# Patient Record
Sex: MALE | Race: WHITE | NOT HISPANIC OR LATINO | Employment: STUDENT | URBAN - METROPOLITAN AREA
[De-identification: names, ages, dates, MRNs, and addresses within clinical notes are randomized per-mention and may not be internally consistent; named-entity substitution may affect disease eponyms.]

---

## 2023-05-03 PROBLEM — F41.9 ANXIETY DISORDER: Status: ACTIVE | Noted: 2023-05-03

## 2023-05-03 PROBLEM — R50.9 FEVER: Status: ACTIVE | Noted: 2023-05-03

## 2023-05-03 PROBLEM — R48.0 DYSLEXIA: Status: ACTIVE | Noted: 2023-05-03

## 2023-05-03 PROBLEM — U07.1 COVID-19 VIRUS INFECTION: Status: ACTIVE | Noted: 2023-05-03

## 2023-05-03 RX ORDER — MOMETASONE FUROATE 50 UG/1
2 SPRAY, METERED NASAL DAILY
COMMUNITY
End: 2024-04-03 | Stop reason: ALTCHOICE

## 2023-05-03 RX ORDER — HYDROXYZINE HYDROCHLORIDE 25 MG/1
25 TABLET, FILM COATED ORAL 3 TIMES DAILY
COMMUNITY
Start: 2019-03-05 | End: 2024-04-03 | Stop reason: ALTCHOICE

## 2023-05-03 RX ORDER — TRIAMCINOLONE ACETONIDE 1 MG/G
0.1 CREAM TOPICAL 2 TIMES DAILY
COMMUNITY
End: 2024-04-03 | Stop reason: ALTCHOICE

## 2023-05-03 RX ORDER — SERTRALINE HYDROCHLORIDE 100 MG/1
100 TABLET, FILM COATED ORAL DAILY
COMMUNITY
Start: 2019-03-05 | End: 2023-05-04 | Stop reason: SDUPTHER

## 2023-05-04 ENCOUNTER — OFFICE VISIT (OUTPATIENT)
Dept: PEDIATRICS | Facility: CLINIC | Age: 23
End: 2023-05-04
Payer: COMMERCIAL

## 2023-05-04 VITALS
DIASTOLIC BLOOD PRESSURE: 80 MMHG | HEIGHT: 70 IN | SYSTOLIC BLOOD PRESSURE: 125 MMHG | BODY MASS INDEX: 42.78 KG/M2 | WEIGHT: 298.8 LBS | HEART RATE: 81 BPM

## 2023-05-04 DIAGNOSIS — Z23 ENCOUNTER FOR IMMUNIZATION: ICD-10-CM

## 2023-05-04 DIAGNOSIS — F41.9 ANXIETY DISORDER, UNSPECIFIED TYPE: Primary | ICD-10-CM

## 2023-05-04 DIAGNOSIS — Z00.00 WELL ADULT EXAM: ICD-10-CM

## 2023-05-04 PROBLEM — R50.9 FEVER: Status: RESOLVED | Noted: 2023-05-03 | Resolved: 2023-05-04

## 2023-05-04 PROCEDURE — 90471 IMMUNIZATION ADMIN: CPT | Performed by: PEDIATRICS

## 2023-05-04 PROCEDURE — 99395 PREV VISIT EST AGE 18-39: CPT | Performed by: PEDIATRICS

## 2023-05-04 PROCEDURE — 90715 TDAP VACCINE 7 YRS/> IM: CPT | Performed by: PEDIATRICS

## 2023-05-04 RX ORDER — SERTRALINE HYDROCHLORIDE 100 MG/1
100 TABLET, FILM COATED ORAL DAILY
Qty: 30 TABLET | Refills: 5 | Status: SHIPPED | OUTPATIENT
Start: 2023-05-04 | End: 2023-06-09 | Stop reason: SDUPTHER

## 2023-05-04 RX ORDER — COVID-19 ANTIGEN TEST
KIT MISCELLANEOUS
COMMUNITY
Start: 2022-08-03 | End: 2024-04-03 | Stop reason: ALTCHOICE

## 2023-05-05 NOTE — PROGRESS NOTES
"Subjective   History was provided by  Jorge .    Justino is a 23 y.o. male who is here for this adolescent well visit.    Current Issues:  Current medical concerns include, anxiety and dyslexia.  Growth and developmental concerns include, no.  Mental health concerns include, has done well with sertraline for anxiety, has not been having panic attacks and has not needed to take hydroxyzine.  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    Review of Nutrition:  Current diet: regular  Balanced diet? yes  Supplements? no  Constipation? No    Sleep Pattern:  Adequate nighttime sleep, healthy sleep cycle and awakens well rested, no snoring.    Social Screening:   Supportive family relations, yes  Supportive social relationships and interaction, yes  Healthy physical, emotional and sexual development, yes  Academic engagement, performance and future plans,  content with present job  Accomplishments over the past year include, work, managing anxiety  Problem solving skills, insightful,  some , cognitive impairment    Screening Questions:  PHQ-9 teen questionnaire completed, discussed and scanned into record yes  High risk health behaviors such as smoking, vaping, substance use/abuse, milton/gambling discussed.      Objective   /80   Pulse 81   Ht 1.778 m (5' 10\")   Wt 136 kg (298 lb 12.8 oz)   BMI 42.87 kg/m²   Growth parameters are noted and are appropriate for age.  General:   alert and oriented, in no acute distress   Gait:   normal   Skin:   normal   Oral cavity:   lips, mucosa, and tongue normal; teeth and gums normal   Eyes:   sclerae white, pupils equal and reactive   Ears:   normal bilaterally   Neck:   no adenopathy and thyroid not enlarged, symmetric, no tenderness/mass/nodules   Lungs:  clear to auscultation bilaterally   Heart:   regular rate and rhythm, S1, S2 normal, no murmur, click, rub or gallop   Abdomen:  soft, non-tender; bowel sounds normal; no masses, " no organomegaly   :  exam deferred   Tom Stage:   5   Extremities:  extremities normal, warm and well-perfused; no cyanosis, clubbing, or edema, negative forward bend   Neuro:  normal without focal findings and muscle tone and strength normal and symmetric     Assessment/Plan   Well adolescent male    1.  Growth and weight gain appropriate for age. Justino was counseled regarding nutrition and physical activity.  2. Justino was counseled on ongoing physical, emotional and sexual developmental health as indicated.   3. Vaccines provided per orders  4. Follow up in 1 year for next well child exam or sooner with concerns.    5. Check screening lipid profile per orders.    6. Anxiety and cognitive impairment, continue sertraline at 100 mg daily dosage  7.  Next wellness exam should be with internal medicine, I referred Jorge to Franco Toney MD MPH

## 2023-05-25 ENCOUNTER — LAB (OUTPATIENT)
Dept: LAB | Facility: LAB | Age: 23
End: 2023-05-25
Payer: COMMERCIAL

## 2023-05-25 DIAGNOSIS — Z00.00 WELL ADULT EXAM: ICD-10-CM

## 2023-05-25 LAB
BASOPHILS (10*3/UL) IN BLOOD BY AUTOMATED COUNT: 0.03 X10E9/L (ref 0–0.1)
BASOPHILS/100 LEUKOCYTES IN BLOOD BY AUTOMATED COUNT: 0.4 % (ref 0–2)
CHOLESTEROL (MG/DL) IN SER/PLAS: 184 MG/DL (ref 0–199)
CHOLESTEROL IN HDL (MG/DL) IN SER/PLAS: 39.8 MG/DL
CHOLESTEROL/HDL RATIO: 4.6
EOSINOPHILS (10*3/UL) IN BLOOD BY AUTOMATED COUNT: 0.08 X10E9/L (ref 0–0.7)
EOSINOPHILS/100 LEUKOCYTES IN BLOOD BY AUTOMATED COUNT: 1.1 % (ref 0–6)
ERYTHROCYTE DISTRIBUTION WIDTH (RATIO) BY AUTOMATED COUNT: 12.8 % (ref 11.5–14.5)
ERYTHROCYTE MEAN CORPUSCULAR HEMOGLOBIN CONCENTRATION (G/DL) BY AUTOMATED: 32.4 G/DL (ref 32–36)
ERYTHROCYTE MEAN CORPUSCULAR VOLUME (FL) BY AUTOMATED COUNT: 90 FL (ref 80–100)
ERYTHROCYTES (10*6/UL) IN BLOOD BY AUTOMATED COUNT: 5.43 X10E12/L (ref 4.5–5.9)
HEMATOCRIT (%) IN BLOOD BY AUTOMATED COUNT: 48.8 % (ref 41–52)
HEMOGLOBIN (G/DL) IN BLOOD: 15.8 G/DL (ref 13.5–17.5)
IMMATURE GRANULOCYTES/100 LEUKOCYTES IN BLOOD BY AUTOMATED COUNT: 0.3 % (ref 0–0.9)
LDL: 124 MG/DL (ref 0–119)
LEUKOCYTES (10*3/UL) IN BLOOD BY AUTOMATED COUNT: 7.3 X10E9/L (ref 4.4–11.3)
LYMPHOCYTES (10*3/UL) IN BLOOD BY AUTOMATED COUNT: 2.49 X10E9/L (ref 1.2–4.8)
LYMPHOCYTES/100 LEUKOCYTES IN BLOOD BY AUTOMATED COUNT: 34 % (ref 13–44)
MONOCYTES (10*3/UL) IN BLOOD BY AUTOMATED COUNT: 0.46 X10E9/L (ref 0.1–1)
MONOCYTES/100 LEUKOCYTES IN BLOOD BY AUTOMATED COUNT: 6.3 % (ref 2–10)
NEUTROPHILS (10*3/UL) IN BLOOD BY AUTOMATED COUNT: 4.24 X10E9/L (ref 1.2–7.7)
NEUTROPHILS/100 LEUKOCYTES IN BLOOD BY AUTOMATED COUNT: 57.9 % (ref 40–80)
NON HDL CHOLESTEROL: 144 MG/DL (ref 0–149)
NRBC (PER 100 WBCS) BY AUTOMATED COUNT: 0 /100 WBC (ref 0–0)
PLATELETS (10*3/UL) IN BLOOD AUTOMATED COUNT: 263 X10E9/L (ref 150–450)
TRIGLYCERIDE (MG/DL) IN SER/PLAS: 102 MG/DL (ref 0–149)
VLDL: 20 MG/DL (ref 0–40)

## 2023-05-25 PROCEDURE — 85025 COMPLETE CBC W/AUTO DIFF WBC: CPT

## 2023-05-25 PROCEDURE — 80061 LIPID PANEL: CPT

## 2023-05-25 PROCEDURE — 36415 COLL VENOUS BLD VENIPUNCTURE: CPT

## 2023-06-09 ENCOUNTER — OFFICE VISIT (OUTPATIENT)
Dept: PEDIATRICS | Facility: CLINIC | Age: 23
End: 2023-06-09
Payer: COMMERCIAL

## 2023-06-09 VITALS
WEIGHT: 293.3 LBS | SYSTOLIC BLOOD PRESSURE: 137 MMHG | HEART RATE: 86 BPM | DIASTOLIC BLOOD PRESSURE: 83 MMHG | BODY MASS INDEX: 42.08 KG/M2

## 2023-06-09 DIAGNOSIS — F41.9 ANXIETY DISORDER, UNSPECIFIED TYPE: Primary | ICD-10-CM

## 2023-06-09 PROCEDURE — 99213 OFFICE O/P EST LOW 20 MIN: CPT | Performed by: PEDIATRICS

## 2023-06-09 RX ORDER — SERTRALINE HYDROCHLORIDE 100 MG/1
100 TABLET, FILM COATED ORAL DAILY
Qty: 90 TABLET | Refills: 1 | Status: SHIPPED | OUTPATIENT
Start: 2023-06-09 | End: 2023-10-20 | Stop reason: SDUPTHER

## 2023-06-09 RX ORDER — SERTRALINE HYDROCHLORIDE 25 MG/1
25 TABLET, FILM COATED ORAL DAILY
Qty: 90 TABLET | Refills: 1 | Status: SHIPPED | OUTPATIENT
Start: 2023-06-09 | End: 2023-10-20 | Stop reason: SDUPTHER

## 2023-10-19 ENCOUNTER — TELEMEDICINE (OUTPATIENT)
Dept: PEDIATRICS | Facility: CLINIC | Age: 23
End: 2023-10-19
Payer: COMMERCIAL

## 2023-10-19 DIAGNOSIS — F41.9 ANXIETY DISORDER, UNSPECIFIED TYPE: Primary | ICD-10-CM

## 2023-10-19 PROCEDURE — 99213 OFFICE O/P EST LOW 20 MIN: CPT | Performed by: PEDIATRICS

## 2023-10-20 RX ORDER — SERTRALINE HYDROCHLORIDE 100 MG/1
100 TABLET, FILM COATED ORAL DAILY
Qty: 90 TABLET | Refills: 1 | Status: SHIPPED | OUTPATIENT
Start: 2023-10-20 | End: 2024-04-03 | Stop reason: SDUPTHER

## 2023-10-20 RX ORDER — SERTRALINE HYDROCHLORIDE 25 MG/1
25 TABLET, FILM COATED ORAL DAILY
Qty: 90 TABLET | Refills: 1 | Status: SHIPPED | OUTPATIENT
Start: 2023-10-20 | End: 2024-04-03 | Stop reason: SDUPTHER

## 2023-10-20 NOTE — PROGRESS NOTES
Justino joins me via virtual telehealth link with the concern of sertraline med check.  Symptoms include anxiety, previously overwhelmed at work. Jorge reports good response to sertraline 125 mg daily. 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    Clinical Impression:  anxiety    Plan: continue Sertraline 125 mg daily, follow up med check in 6 months    Justino should be evaluated in office if his symptoms persist or progress.    Josesito Toney MD MPH

## 2024-04-03 ENCOUNTER — OFFICE VISIT (OUTPATIENT)
Dept: PRIMARY CARE | Facility: CLINIC | Age: 24
End: 2024-04-03
Payer: COMMERCIAL

## 2024-04-03 VITALS
BODY MASS INDEX: 42.76 KG/M2 | DIASTOLIC BLOOD PRESSURE: 82 MMHG | HEART RATE: 76 BPM | OXYGEN SATURATION: 97 % | SYSTOLIC BLOOD PRESSURE: 132 MMHG | WEIGHT: 298 LBS

## 2024-04-03 DIAGNOSIS — E66.01 MORBID OBESITY (MULTI): ICD-10-CM

## 2024-04-03 DIAGNOSIS — F41.9 ANXIETY DISORDER, UNSPECIFIED TYPE: ICD-10-CM

## 2024-04-03 DIAGNOSIS — E55.9 MILD VITAMIN D DEFICIENCY: ICD-10-CM

## 2024-04-03 DIAGNOSIS — Z00.00 HEALTH MAINTENANCE EXAMINATION: Primary | ICD-10-CM

## 2024-04-03 PROCEDURE — 99395 PREV VISIT EST AGE 18-39: CPT | Performed by: STUDENT IN AN ORGANIZED HEALTH CARE EDUCATION/TRAINING PROGRAM

## 2024-04-03 RX ORDER — SERTRALINE HYDROCHLORIDE 100 MG/1
100 TABLET, FILM COATED ORAL DAILY
Qty: 90 TABLET | Refills: 1 | Status: SHIPPED | OUTPATIENT
Start: 2024-04-03 | End: 2024-09-30

## 2024-04-03 RX ORDER — SERTRALINE HYDROCHLORIDE 25 MG/1
25 TABLET, FILM COATED ORAL DAILY
Qty: 90 TABLET | Refills: 1 | Status: SHIPPED | OUTPATIENT
Start: 2024-04-03 | End: 2024-09-30

## 2024-04-03 NOTE — PATIENT INSTRUCTIONS
Mental Health Referrals:   [facility] [place] ([email or web address]  [facility] ([email or web address]    LABS:   Please obtain fasting in [address] in this building  Orders in the system    Dietary Habits/Exercise:   Keep up the great exercise habits; consider some of the core workouts we discussed including leg lifts and planks  Try to start cooking with mom and dad at home   Nutrition referral; can call [phone] to schedule    Medication Refills:  Zoloft sent to [facility] pharmacy in my name     Sleep Study:   We will consider a sleep study for you; can discuss more at our visit in 6 months!    Follow up in 6 months!    Dr. MICKY Dobbs

## 2024-04-03 NOTE — PROGRESS NOTES
Justino Garcia is a 24 y.o. male seen in Clinic at /[facility] by Dr. rFanco Workman on 04/03/24 for routine care, as well as for management of the following chronic medical conditions: anxiety, learning disability, morbid obesity. Patient presents today to establish care and for CPE. He is accompanied by his mother. Previously seen by [facility], Dr. Toney. He is overall doing very well, completing community college and transitioning to [employer] this fall with plans to live on campus. His weight has been a challenge but he is doing an excellent job of regularly exercise (around 4 hours per week), mix of both cardio and weight bearing. Discussed and encouraged continuing this with further diversification of workup plan. Also discussed dietary habits, encouraging him to prepare more of his own meals in preparation for independent living, dietician referral offered. Due for labs screening, ordered today.     #Anxiety, prior panic attacks   #Learning Disability--Dyslexia   - Zoloft 125mg yamilet   - last increase in Summer 2023 (from 100 to 125mg dosing)  [  ] psychology referral recommendations provided today   [  ] medication refill   [  ] continue close follow up every 3-6 months     #Obesity   #Concern for SUKHJINDER   [  ] lab screening  [  ] STOP BANG score at least 4, discussed possibly pursuing sleep study (patient's father with SUKHJINDER); considering and will focus on lifestyle changes, trying to promote weight loss  [  ] nutrition referral     Past Medical History: as above   No past medical history on file.  Subspecialty Medical Care: none     Past Surgical History: wisdom teeth   No past surgical history on file.    Medications:   Current Outpatient Medications:     sertraline (Zoloft) 100 mg tablet, Take 1 tablet (100 mg) by mouth once daily. Take 1 tablet 100 mg with 1 tablet 25 mg by mouth daily, Disp: 90 tablet, Rfl: 1    sertraline (Zoloft) 25 mg tablet, Take 1 tablet (25 mg) by mouth once daily. Take 1 tablet  (25 mg) with 1 tablet (100 mg) by mouth daily, Disp: 90 tablet, Rfl: 1  Ph[facility]acy: [facility] ([place]     Allergies:   Allergies   Allergen Reactions    Methylphenidate Hcl Other     Immunizations:   - Tdap 2023-->due 2033  - Hep A complete  - HPV complete  - Men A complete     Family History:   - Father with SUKHJINDER   - Maternal side with diabetes   No family history on file.    Social History:   Home/Living Situation: lives at home with mother and father   Education: starting at [employer] in fall   Employment/Work/Vocational: communications   Activities: exercises at gym 4x per week (around 1 hour each session)  Drug Use: rare alcohol use, no smoking   Diet: discussed healthy dietary habits, nutrition referral today   Sexuality/Contraception/Menstrual History: not sexually active, condom use and consent discussed  Suicide/Depression/Anxiety: known anxiety diagnosis on SSRI   Sleep: concern for SUKHJINDER as above     Transition Processes:  Financial/Health Insurance: parents  Transportation:   Voting:   Legal/Guardian: mother and father are emergency contact   Contact Information: correct in EMR     Visit Vitals  /82   Pulse 76   Wt 135 kg (298 lb)   SpO2 97%   BMI 42.76 kg/m²   BSA 2.58 m²      PHYSICAL EXAM:   General: well appearing  male in NAD, pleasant and engaged in encounter    HEENT: NCAT, MMM, large neck circumference   CV: RRR, no m/r/g  PULM: CTAB, non-labored respirations   ABD: soft, obese, NT, ND, + bowel sounds   : no suprapubic or CVA tenderness   EXT: WWP, no significant edema   SKIN: +facial acne  NEURO: A&Ox4, symmetric facies, no gross motor or sensory deficits, normal gait  PSYCH: pleasant mood, appropriate affect     Assessment/Plan    Justino Garcia is a 24 y.o. male seen in Clinic at /[facility] by Dr. Franco Workman on 04/03/24 for routine care, as well as for management of the following chronic medical conditions: anxiety, learning disability, morbid obesity. Patient presents today to  establish care and for CPE. He is accompanied by his mother. Previously seen by [facility], Dr. Toney. He is overall doing very well, completing community college and transitioning to [employer] this fall with plans to live on campus. His weight has been a challenge but he is doing an excellent job of regularly exercise (around 4 hours per week), mix of both cardio and weight bearing. Discussed and encouraged continuing this with further diversification of workup plan. Also discussed dietary habits, encouraging him to prepare more of his own meals in preparation for independent living, dietician referral offered. Due for labs screening, ordered today.     #Anxiety, prior panic attacks   #Learning Disability--Dyslexia   - Zoloft 125mg yamilet   - last increase in Summer 2023 (from 100 to 125mg dosing)  [  ] psychology referral recommendations provided today   [  ] medication refill   [  ] continue close follow up every 3-6 months     #Obesity   #Concern for SUKHJINDER   [  ] lab screening  [  ] STOP BANG score at least 4, discussed possibly pursuing sleep study (patient's father with SUKHJINDER); considering and will focus on lifestyle changes, trying to promote weight loss  [  ] nutrition referral     #Health Maintenance   - Vision, Hearing screens: prior optho  - Counseling regarding alcohol/tobacco/drug use: rare alcohol use, non-smoker   - Depression screen: known anxiety diagnosis  - BMI, Lipid, A1C screening and nutritional/exercise counseling: labs   - Blood Pressure: borderline, continue to monitor closely   - Safe Sexual Practices, STI, HIV screening: defers STI testing     #Transition of Care/Young Adult Care  - Health Literacy Assessment: adequate   - Medications: Active medications reviewed and updated  - Allergies: Reviewed and updated   - Immunizations: routine childhood/young adult immunizations UTD; recommend staying UTD with COVID boosters   - Resources Provided: labs, nutrition     Return to clinic in 3-6  months for follow-up, sooner if acute issues arise.     Franco Workman MD  Internal Medicine-Pediatrics   [facility] [place] [facility] [address], [address]  P: [phone], F: [phone]

## 2024-04-10 ENCOUNTER — LAB (OUTPATIENT)
Dept: LAB | Facility: LAB | Age: 24
End: 2024-04-10
Payer: COMMERCIAL

## 2024-04-10 DIAGNOSIS — E66.01 MORBID OBESITY (MULTI): ICD-10-CM

## 2024-04-10 DIAGNOSIS — F41.9 ANXIETY DISORDER, UNSPECIFIED TYPE: ICD-10-CM

## 2024-04-10 DIAGNOSIS — E55.9 MILD VITAMIN D DEFICIENCY: ICD-10-CM

## 2024-04-10 LAB
ALBUMIN SERPL BCP-MCNC: 4.8 G/DL (ref 3.4–5)
ALP SERPL-CCNC: 95 U/L (ref 33–120)
ALT SERPL W P-5'-P-CCNC: 26 U/L (ref 10–52)
ANION GAP SERPL CALC-SCNC: 16 MMOL/L (ref 10–20)
AST SERPL W P-5'-P-CCNC: 25 U/L (ref 9–39)
BILIRUB SERPL-MCNC: 0.6 MG/DL (ref 0–1.2)
BUN SERPL-MCNC: 15 MG/DL (ref 6–23)
CALCIUM SERPL-MCNC: 10.1 MG/DL (ref 8.6–10.6)
CHLORIDE SERPL-SCNC: 105 MMOL/L (ref 98–107)
CHOLEST SERPL-MCNC: 192 MG/DL (ref 0–199)
CHOLESTEROL/HDL RATIO: 4.4
CO2 SERPL-SCNC: 27 MMOL/L (ref 21–32)
CREAT SERPL-MCNC: 0.76 MG/DL (ref 0.5–1.3)
EGFRCR SERPLBLD CKD-EPI 2021: >90 ML/MIN/1.73M*2
EST. AVERAGE GLUCOSE BLD GHB EST-MCNC: 97 MG/DL
GLUCOSE SERPL-MCNC: 86 MG/DL (ref 74–99)
HBA1C MFR BLD: 5 %
HDLC SERPL-MCNC: 43.2 MG/DL
LDLC SERPL CALC-MCNC: 115 MG/DL
NON HDL CHOLESTEROL: 149 MG/DL (ref 0–149)
POTASSIUM SERPL-SCNC: 4.7 MMOL/L (ref 3.5–5.3)
PROT SERPL-MCNC: 7.7 G/DL (ref 6.4–8.2)
SODIUM SERPL-SCNC: 143 MMOL/L (ref 136–145)
TRIGL SERPL-MCNC: 171 MG/DL (ref 0–149)
VLDL: 34 MG/DL (ref 0–40)

## 2024-04-10 PROCEDURE — 80053 COMPREHEN METABOLIC PANEL: CPT

## 2024-04-10 PROCEDURE — 84443 ASSAY THYROID STIM HORMONE: CPT

## 2024-04-10 PROCEDURE — 36415 COLL VENOUS BLD VENIPUNCTURE: CPT

## 2024-04-10 PROCEDURE — 83036 HEMOGLOBIN GLYCOSYLATED A1C: CPT

## 2024-04-10 PROCEDURE — 82306 VITAMIN D 25 HYDROXY: CPT

## 2024-04-10 PROCEDURE — 80061 LIPID PANEL: CPT

## 2024-04-11 LAB
25(OH)D3 SERPL-MCNC: 14 NG/ML (ref 30–100)
TSH SERPL-ACNC: 2.09 MIU/L (ref 0.44–3.98)

## 2024-04-11 RX ORDER — ERGOCALCIFEROL 1.25 MG/1
1.25 CAPSULE ORAL
Qty: 8 CAPSULE | Refills: 0 | Status: SHIPPED | OUTPATIENT
Start: 2024-04-14 | End: 2025-04-14

## 2024-10-04 ENCOUNTER — APPOINTMENT (OUTPATIENT)
Dept: PRIMARY CARE | Facility: CLINIC | Age: 24
End: 2024-10-04
Payer: COMMERCIAL

## 2024-10-04 VITALS
WEIGHT: 297 LBS | SYSTOLIC BLOOD PRESSURE: 118 MMHG | BODY MASS INDEX: 41.58 KG/M2 | OXYGEN SATURATION: 98 % | HEART RATE: 84 BPM | HEIGHT: 71 IN | DIASTOLIC BLOOD PRESSURE: 83 MMHG

## 2024-10-04 DIAGNOSIS — F41.9 ANXIETY DISORDER, UNSPECIFIED TYPE: Primary | ICD-10-CM

## 2024-10-04 DIAGNOSIS — E66.01 MORBID OBESITY (MULTI): ICD-10-CM

## 2024-10-04 PROCEDURE — 3008F BODY MASS INDEX DOCD: CPT | Performed by: STUDENT IN AN ORGANIZED HEALTH CARE EDUCATION/TRAINING PROGRAM

## 2024-10-04 PROCEDURE — 1036F TOBACCO NON-USER: CPT | Performed by: STUDENT IN AN ORGANIZED HEALTH CARE EDUCATION/TRAINING PROGRAM

## 2024-10-04 PROCEDURE — 99213 OFFICE O/P EST LOW 20 MIN: CPT | Performed by: STUDENT IN AN ORGANIZED HEALTH CARE EDUCATION/TRAINING PROGRAM

## 2024-10-04 RX ORDER — SERTRALINE HYDROCHLORIDE 25 MG/1
25 TABLET, FILM COATED ORAL DAILY
Qty: 90 TABLET | Refills: 3 | Status: SHIPPED | OUTPATIENT
Start: 2024-10-04 | End: 2025-09-29

## 2024-10-04 RX ORDER — SERTRALINE HYDROCHLORIDE 100 MG/1
100 TABLET, FILM COATED ORAL DAILY
Qty: 90 TABLET | Refills: 3 | Status: SHIPPED | OUTPATIENT
Start: 2024-10-04 | End: 2025-09-29

## 2024-10-04 ASSESSMENT — PATIENT HEALTH QUESTIONNAIRE - PHQ9
2. FEELING DOWN, DEPRESSED OR HOPELESS: NOT AT ALL
1. LITTLE INTEREST OR PLEASURE IN DOING THINGS: NOT AT ALL
SUM OF ALL RESPONSES TO PHQ9 QUESTIONS 1 AND 2: 0

## 2024-10-04 ASSESSMENT — PAIN SCALES - GENERAL: PAINLEVEL: 0-NO PAIN

## 2024-10-04 NOTE — PROGRESS NOTES
Justino Garcia is a 24 y.o. male seen in Clinic at /[facility] by Dr. Franco Workman on 10/04/24 for routine care, as well as for management of the following chronic medical conditions: anxiety, learning disability, morbid obesity. Patient presents today for follow up visit.     Started at [employer] this fall with plans to live on campus.   His weight continues to be a challenge but is stable/down a few pounds since last visit  Has been exercising regularly   Prior dietician referral; working on choosing healthy dietary habits at school   Labs from Spring 2024 reassuring  Vitamin D supplement encouraged at that time; advised to transition to 2000 international units per day at this point     #Anxiety, prior panic attacks   #Learning Disability--Dyslexia   - Zoloft 125mg yamilet   - last increase in Summer 2023 (from 100 to 125mg dosing)  [  ] psychology referral recommendations: has connected with mental health team at school for as needed support; no regular counseling at present, doing well   [  ] medication refill   [  ] continue close follow up every 6 months     #Obesity   #Concern for SUKHJINDER   [x] lab screening: reassuring spring 2024   [  ] STOP BANG score at least 4, discussed possibly pursuing sleep study (patient's father with SUKHJINDER); considering and will focus on lifestyle changes, trying to promote weight loss  - prior nutrition referral     Past Medical History: as above   No past medical history on file.  Subspecialty Medical Care: none     Past Surgical History: wisdom teeth   No past surgical history on file.    Medications:   Current Outpatient Medications:     sertraline (Zoloft) 100 mg tablet, Take 1 tablet (100 mg) by mouth once daily. Take 1 tablet 100 mg with 1 tablet 25 mg by mouth daily, Disp: 90 tablet, Rfl: 1    sertraline (Zoloft) 25 mg tablet, Take 1 tablet (25 mg) by mouth once daily. Take 1 tablet (25 mg) with 1 tablet (100 mg) by mouth daily, Disp: 90 tablet, Rfl: 1  Pharmacy: [facility]  "    Allergies:   Allergies   Allergen Reactions    Methylphenidate Hcl Other     Immunizations:   - Flu advised: DEFERRED by patient today  - Updated COVID booster advised   - Tdap 2023-->due 2033  - Hep A complete  - HPV complete  - Men A complete     Family History:   - Father with SUKHJINDER   - Maternal side with diabetes   No family history on file.    Social History:   Home/Living Situation: lives at home with mother and father   Education: starting at [employer] in fall   Employment/Work/Vocational: communications   Activities: exercises at gym 4x per week (around 1 hour each session)  Drug Use: rare alcohol use, no smoking   Diet: discussed healthy dietary habits, nutrition referral today   Sexuality/Contraception/Menstrual History: not sexually active, condom use and consent discussed  Suicide/Depression/Anxiety: known anxiety diagnosis on SSRI   Sleep: concern for SUKHJINDER as above     Transition Processes:  Financial/Health Insurance: parents  Transportation:   Voting:   Legal/Guardian: mother and father are emergency contact   Contact Information: correct in EMR     Visit Vitals  /83 (BP Location: Left arm, Patient Position: Sitting, BP Cuff Size: Large adult)   Pulse 84   Ht 1.803 m (5' 11\")   Wt 135 kg (297 lb)   SpO2 98%   BMI 41.42 kg/m²   Smoking Status Never   BSA 2.6 m²      PHYSICAL EXAM:   General: well appearing  male in NAD, pleasant and engaged in encounter    HEENT: NCAT, MMM, large neck circumference   CV: RRR, no m/r/g  PULM: CTAB, non-labored respirations   ABD: soft, obese, NT, ND, + bowel sounds   : no suprapubic or CVA tenderness   EXT: WWP, no significant edema   SKIN: +facial acne  NEURO: A&Ox4, symmetric facies, no gross motor or sensory deficits, normal gait  PSYCH: pleasant mood, appropriate affect     Assessment/Plan    Justino Garcia is a 24 y.o. male seen in Clinic at /[facility] by Dr. Franco Workman on 10/04/24 for routine care, as well as for management of the following chronic " medical conditions: anxiety, learning disability, morbid obesity. Patient presents today for follow up visit.     Started at [employer] this fall with plans to live on campus.   His weight continues to be a challenge but is stable/down a few pounds since last visit  Has been exercising regularly   Prior dietician referral; working on choosing healthy dietary habits at school   Labs from Spring 2024 reassuring  Vitamin D supplement encouraged at that time; advised to transition to 2000 international units per day at this point     #Anxiety, prior panic attacks   #Learning Disability--Dyslexia   - Zoloft 125mg yamilet   - last increase in Summer 2023 (from 100 to 125mg dosing)  [  ] psychology referral recommendations: has connected with mental health team at school for as needed support; no regular counseling at present, doing well   [  ] medication refill   [  ] continue close follow up every 6 months     #Obesity   #Concern for SUKHJINDER   [x] lab screening: reassuring spring 2024   [  ] STOP BANG score at least 4, discussed possibly pursuing sleep study (patient's father with SUKHJINDER); considering and will focus on lifestyle changes, trying to promote weight loss  - prior nutrition referral     #Health Maintenance   - Vision, Hearing screens: prior optho  - Counseling regarding alcohol/tobacco/drug use: rare alcohol use, non-smoker   - Depression screen: known anxiety diagnosis  - BMI, Lipid, A1C screening and nutritional/exercise counseling: reassuring labs 2024    - Blood Pressure: WNL today, continue to monitor closely   - Safe Sexual Practices, STI, HIV screening: defers STI testing     #Transition of Care/Young Adult Care  - Health Literacy Assessment: adequate   - Medications: Active medications reviewed and updated  - Allergies: Reviewed and updated   - Immunizations: routine childhood/young adult immunizations UTD; recommend staying UTD with COVID boosters, defers Flu shot today   - Resources Provided: med refills     Return  to clinic in 6 months for follow-up, sooner if acute issues arise.     Franco Workman MD  Internal Medicine-Pediatrics   [facility] [address], [address]  P: [phone], F: [phone]

## 2024-10-19 DIAGNOSIS — F41.9 ANXIETY DISORDER, UNSPECIFIED TYPE: ICD-10-CM

## 2024-10-29 RX ORDER — SERTRALINE HYDROCHLORIDE 100 MG/1
TABLET, FILM COATED ORAL
Qty: 90 TABLET | Refills: 3 | Status: SHIPPED | OUTPATIENT
Start: 2024-10-29

## 2025-01-10 ENCOUNTER — OFFICE VISIT (OUTPATIENT)
Dept: URGENT CARE | Age: 25
End: 2025-01-10
Payer: COMMERCIAL

## 2025-01-10 VITALS
BODY MASS INDEX: 40.23 KG/M2 | HEART RATE: 94 BPM | RESPIRATION RATE: 18 BRPM | OXYGEN SATURATION: 95 % | TEMPERATURE: 98.3 F | DIASTOLIC BLOOD PRESSURE: 83 MMHG | HEIGHT: 72 IN | SYSTOLIC BLOOD PRESSURE: 137 MMHG | WEIGHT: 297 LBS

## 2025-01-10 DIAGNOSIS — J01.90 ACUTE NON-RECURRENT SINUSITIS, UNSPECIFIED LOCATION: Primary | ICD-10-CM

## 2025-01-10 DIAGNOSIS — R05.1 ACUTE COUGH: ICD-10-CM

## 2025-01-10 RX ORDER — BENZONATATE 200 MG/1
200 CAPSULE ORAL 3 TIMES DAILY PRN
Qty: 30 CAPSULE | Refills: 0 | Status: SHIPPED | OUTPATIENT
Start: 2025-01-10

## 2025-01-10 RX ORDER — AMOXICILLIN AND CLAVULANATE POTASSIUM 875; 125 MG/1; MG/1
875 TABLET, FILM COATED ORAL 2 TIMES DAILY
Qty: 20 TABLET | Refills: 0 | Status: SHIPPED | OUTPATIENT
Start: 2025-01-10 | End: 2025-01-20

## 2025-01-10 ASSESSMENT — ENCOUNTER SYMPTOMS
SORE THROAT: 0
SINUS COMPLAINT: 1
SHORTNESS OF BREATH: 0
FEVER: 0
DIAPHORESIS: 0
RHINORRHEA: 1
CHILLS: 0
COUGH: 1

## 2025-01-10 NOTE — PROGRESS NOTES
Subjective   Patient ID: Justino Garcia is a 24 y.o. male. They present today with a chief complaint of Cough, Nasal Congestion, and Sinus Problem (X 4days).    History of Present Illness  Nasal congestion/rhinorrhea, productive cough (worse laying) started mon.     Denies fever, chills, sweats, chest pain, SOB, ear pain, sore throat.      Cough  Associated symptoms include rhinorrhea. Pertinent negatives include no chest pain, chills, ear pain, fever, sore throat or shortness of breath.   Sinus Problem  Associated symptoms: congestion, cough and rhinorrhea    Associated symptoms: no chest pain, no ear pain, no fever, no shortness of breath and no sore throat        Past Medical History  Allergies as of 01/10/2025 - Reviewed 01/10/2025   Allergen Reaction Noted    Methylphenidate hcl Other 06/09/2023       (Not in a hospital admission)       History reviewed. No pertinent past medical history.    History reviewed. No pertinent surgical history.     reports that he has never smoked. He has never used smokeless tobacco. He reports that he does not drink alcohol and does not use drugs.    Review of Systems  Review of Systems   Constitutional:  Negative for chills, diaphoresis and fever.   HENT:  Positive for congestion and rhinorrhea. Negative for ear pain and sore throat.    Respiratory:  Positive for cough. Negative for shortness of breath.    Cardiovascular:  Negative for chest pain.   All other systems reviewed and are negative.                                 Objective    Vitals:    01/10/25 1312   BP: 137/83   Pulse: 94   Resp: 18   Temp: 36.8 °C (98.3 °F)   SpO2: 95%   Weight: 135 kg (297 lb)   Height: 1.829 m (6')     No LMP for male patient.    Physical Exam  Vitals and nursing note reviewed.   Constitutional:       General: He is not in acute distress.  HENT:      Nose: Congestion and rhinorrhea present.   Cardiovascular:      Rate and Rhythm: Normal rate and regular rhythm.      Heart sounds: Normal heart  sounds.   Pulmonary:      Effort: Pulmonary effort is normal.      Breath sounds: Normal breath sounds.   Neurological:      Mental Status: He is alert.         Procedures    Point of Care Test & Imaging Results from this visit  No results found for this visit on 01/10/25.   No results found.    Diagnostic study results (if any) were reviewed by Cady Pal PA-C.    Assessment/Plan   Allergies, medications, history, and pertinent labs/EKGs/Imaging reviewed by Cady Pal PA-C.     Orders and Diagnoses  There are no diagnoses linked to this encounter.    Medical Admin Record      Patient disposition: Home    Electronically signed by Cady Pal PA-C  1:33 PM

## 2025-06-03 ENCOUNTER — APPOINTMENT (OUTPATIENT)
Dept: PRIMARY CARE | Facility: CLINIC | Age: 25
End: 2025-06-03
Payer: COMMERCIAL

## 2025-06-03 VITALS
BODY MASS INDEX: 41.8 KG/M2 | SYSTOLIC BLOOD PRESSURE: 116 MMHG | HEART RATE: 74 BPM | OXYGEN SATURATION: 98 % | WEIGHT: 308.2 LBS | DIASTOLIC BLOOD PRESSURE: 75 MMHG

## 2025-06-03 DIAGNOSIS — F41.9 ANXIETY DISORDER, UNSPECIFIED TYPE: Primary | ICD-10-CM

## 2025-06-03 DIAGNOSIS — Z00.00 HEALTH MAINTENANCE EXAMINATION: ICD-10-CM

## 2025-06-03 DIAGNOSIS — Z13.0 SCREENING FOR DEFICIENCY ANEMIA: ICD-10-CM

## 2025-06-03 DIAGNOSIS — E66.01 MORBID OBESITY (MULTI): ICD-10-CM

## 2025-06-03 DIAGNOSIS — E55.9 MILD VITAMIN D DEFICIENCY: ICD-10-CM

## 2025-06-03 PROCEDURE — 99213 OFFICE O/P EST LOW 20 MIN: CPT | Performed by: STUDENT IN AN ORGANIZED HEALTH CARE EDUCATION/TRAINING PROGRAM

## 2025-06-03 PROCEDURE — 1036F TOBACCO NON-USER: CPT | Performed by: STUDENT IN AN ORGANIZED HEALTH CARE EDUCATION/TRAINING PROGRAM

## 2025-06-03 RX ORDER — SERTRALINE HYDROCHLORIDE 100 MG/1
TABLET, FILM COATED ORAL
Qty: 90 TABLET | Refills: 3 | Status: SHIPPED | OUTPATIENT
Start: 2025-06-03

## 2025-06-03 RX ORDER — SERTRALINE HYDROCHLORIDE 25 MG/1
25 TABLET, FILM COATED ORAL DAILY
Qty: 90 TABLET | Refills: 3 | Status: SHIPPED | OUTPATIENT
Start: 2025-06-03 | End: 2026-05-29

## 2025-06-03 ASSESSMENT — PAIN SCALES - GENERAL: PAINLEVEL_OUTOF10: 0-NO PAIN

## 2025-06-03 ASSESSMENT — ENCOUNTER SYMPTOMS
OCCASIONAL FEELINGS OF UNSTEADINESS: 0
LOSS OF SENSATION IN FEET: 0
DEPRESSION: 0

## 2025-06-03 ASSESSMENT — PATIENT HEALTH QUESTIONNAIRE - PHQ9
2. FEELING DOWN, DEPRESSED OR HOPELESS: NOT AT ALL
SUM OF ALL RESPONSES TO PHQ9 QUESTIONS 1 AND 2: 0
1. LITTLE INTEREST OR PLEASURE IN DOING THINGS: NOT AT ALL

## 2025-06-03 NOTE — PATIENT INSTRUCTIONS
Thank you for coming in today!    Labs in [address]    [facility]    If any concerns, please reach out and let me know!    Given things have been going well, we can follow up once yearly. Let me know if you need anything in the meantime. Should you change your mind and wish to pursue a sleep study to evaluate for sleep apnea, I am happy to facilitate!    Best,  Dr. WEEKS

## 2025-06-03 NOTE — PROGRESS NOTES
Justino Garcia is a 25 y.o. male seen in Clinic at /[facility] by Dr. Franco Workman on 06/03/25 for routine care, as well as for management of the following chronic medical conditions: anxiety, learning disability, morbid obesity. Patient presents today for follow up visit.     Doing well at [employer] [place]  Living on campus  No issues or concerns  Accommodating regarding educational needs  Doing well on current SSRI dosing   Weight up around 10 pounds  Discussed dietary/lifestyle habits  Labs today (last reassuring 04/2024)   Again discussed consideration for SUKHJINDER evaluation, he defers     CHRONIC MEDICAL CONDITIONS:   #Anxiety, prior panic attacks   #Learning Disability--Dyslexia   - Zoloft 125mg yamilet   - last increase in Summer 2023 (from 100 to 125mg dosing)  [  ] no concurrent psychology/counseling; doing well at present, CTM, can pursue through health services at school should have any needs   [  ] medication refill   [  ] continue close follow up every 6-12 months     #Obesity   #Concern for SUKHJINDER   [  ] updated labs today (prior reassuring 04/2024)   [  ] STOP BANG score at least 4, discussed possibly pursuing sleep study (patient's father with SUKHJINDER); considering and will focus on lifestyle changes, trying to promote weight loss -- he defers today   - prior nutrition referral     Past Medical History: as above   History reviewed. No pertinent past medical history.  Subspecialty Medical Care: none     Past Surgical History: wisdom teeth   History reviewed. No pertinent surgical history.    Medications:   Current Outpatient Medications:     sertraline (Zoloft) 100 mg tablet, TAKE 1 TABLET BY MOUTH ONCE DAILY (NOTE: TAKE WITH 1 TABLET 25MG DAILY FOR TOTAL DAILY DOSE 125MG)., Disp: 90 tablet, Rfl: 3    sertraline (Zoloft) 25 mg tablet, Take 1 tablet (25 mg) by mouth once daily. Take 1 tablet (25 mg) with 1 tablet (100 mg) by mouth daily, Disp: 90 tablet, Rfl: 3  Pharmacy: [facility] ([place])     Allergies:   Allergies    Allergen Reactions    Methylphenidate Hcl Other     Immunizations:   - Flu advised: advised annually   - Updated COVID booster advised   - Tdap 2023-->due 2033  - Hep A complete  - HPV complete  - Men A complete     Family History:   - Father with SUKHJINDER   - Maternal side with diabetes   No family history on file.    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   Activities: exercises at gym 4x per week (around 1 hour each session)  Drug Use: rare alcohol use, no smoking   Diet: discussed healthy dietary habits discussed, prior nutrition referral   Sexuality/Contraception/Menstrual History: not sexually active, condom use and consent discussed at prior visits  Suicide/Depression/Anxiety: known anxiety diagnosis on SSRI, well managed   Sleep: concern for SUKHJINDER as above, he defers evaluation     Transition Processes:  Financial/Health Insurance: parents  Legal/Guardian: mother and father are emergency contact   Contact Information: correct in EMR     Visit Vitals  /75 (BP Location: Right arm, Patient Position: Sitting, BP Cuff Size: Large adult)   Pulse 74   Wt 140 kg (308 lb 3.2 oz)   SpO2 98%   BMI 41.80 kg/m²   Smoking Status Never   BSA 2.67 m²      PHYSICAL EXAM:   General: well appearing  male in NAD, pleasant and engaged in encounter    HEENT: NCAT, MMM, large neck circumference   CV: RRR, no m/r/g  PULM: CTAB, non-labored respirations   ABD: soft, obese, NT, ND   EXT: WWP, no significant edema   SKIN: +facial acne  NEURO: A&Ox4, symmetric facies, no gross motor or sensory deficits, normal gait  PSYCH: pleasant mood, appropriate affect     Assessment/Plan    Justino Garcia is a 25 y.o. male seen in Clinic at /[facility] by Dr. Franco Workman on 06/03/25 for routine care, as well as for management of the following  chronic medical conditions: anxiety, learning disability, morbid obesity. Patient presents today for follow up visit.     Doing well at [employer]  Living on campus  No issues or concerns  Accommodating regarding educational needs  Doing well on current SSRI dosing   Weight up around 10 pounds  Discussed dietary/lifestyle habits  Labs today (last reassuring 04/2024)   Again discussed consideration for SUKHJINDER evaluation, he defers     CHRONIC MEDICAL CONDITIONS:   #Anxiety, prior panic attacks   #Learning Disability--Dyslexia   - Zoloft 125mg yamilet   - last increase in Summer 2023 (from 100 to 125mg dosing)  [  ] no concurrent psychology/counseling; doing well at present, CTM, can pursue through health services at school should have any needs   [  ] medication refill   [  ] continue close follow up every 6-12 months     #Obesity   #Concern for SUKHJINDER   [  ] updated labs today (prior reassuring 04/2024)   [  ] STOP BANG score at least 4, discussed possibly pursuing sleep study (patient's father with SUKHJINDER); considering and will focus on lifestyle changes, trying to promote weight loss -- he defers today   - prior nutrition referral     #Health Maintenance   - Vision, Hearing screens: prior optho  - Counseling regarding alcohol/tobacco/drug use: rare alcohol use, non-smoker   - Depression screen: known anxiety diagnosis  - BMI, Lipid, A1C screening and nutritional/exercise counseling: reassuring labs 2024; repeat today given obesity   - Blood Pressure: WNL today, continue to monitor closely   - Safe Sexual Practices, STI, HIV screening: defers STI testing, not sexually active     #Transition of Care/Young Adult Care  - Health Literacy Assessment: adequate   - Medications: Active medications reviewed and updated  - Allergies: Reviewed and updated   - Immunizations: routine childhood/young adult immunizations UTD; recommend staying UTD with COVID boosters, Flu vaccine annually   - Resources Provided: med refills, labs     Return to  clinic in 6-12 months.     Franco Workman MD  Internal Medicine-Pediatrics   [facility] [place] [address], [address]  P: [phone], F: [phone]

## 2025-06-11 LAB
25(OH)D3+25(OH)D2 SERPL-MCNC: 23 NG/ML (ref 30–100)
ALBUMIN SERPL-MCNC: 4.3 G/DL (ref 3.6–5.1)
ALP SERPL-CCNC: 85 U/L (ref 36–130)
ALT SERPL-CCNC: 32 U/L (ref 9–46)
ANION GAP SERPL CALCULATED.4IONS-SCNC: 11 MMOL/L (CALC) (ref 7–17)
AST SERPL-CCNC: 29 U/L (ref 10–40)
BASOPHILS # BLD AUTO: 31 CELLS/UL (ref 0–200)
BASOPHILS NFR BLD AUTO: 0.4 %
BILIRUB SERPL-MCNC: 0.4 MG/DL (ref 0.2–1.2)
BUN SERPL-MCNC: 13 MG/DL (ref 7–25)
CALCIUM SERPL-MCNC: 9 MG/DL (ref 8.6–10.3)
CHLORIDE SERPL-SCNC: 104 MMOL/L (ref 98–110)
CO2 SERPL-SCNC: 26 MMOL/L (ref 20–32)
CREAT SERPL-MCNC: 0.78 MG/DL (ref 0.6–1.24)
EGFRCR SERPLBLD CKD-EPI 2021: 129 ML/MIN/1.73M2
EOSINOPHIL # BLD AUTO: 70 CELLS/UL (ref 15–500)
EOSINOPHIL NFR BLD AUTO: 0.9 %
ERYTHROCYTE [DISTWIDTH] IN BLOOD BY AUTOMATED COUNT: 13.3 % (ref 11–15)
EST. AVERAGE GLUCOSE BLD GHB EST-MCNC: 114 MG/DL
EST. AVERAGE GLUCOSE BLD GHB EST-SCNC: 6.3 MMOL/L
GLUCOSE SERPL-MCNC: 80 MG/DL (ref 65–99)
HBA1C MFR BLD: 5.6 %
HCT VFR BLD AUTO: 45 % (ref 38.5–50)
HGB BLD-MCNC: 14.7 G/DL (ref 13.2–17.1)
LYMPHOCYTES # BLD AUTO: 2925 CELLS/UL (ref 850–3900)
LYMPHOCYTES NFR BLD AUTO: 37.5 %
MCH RBC QN AUTO: 29.7 PG (ref 27–33)
MCHC RBC AUTO-ENTMCNC: 32.7 G/DL (ref 32–36)
MCV RBC AUTO: 90.9 FL (ref 80–100)
MONOCYTES # BLD AUTO: 484 CELLS/UL (ref 200–950)
MONOCYTES NFR BLD AUTO: 6.2 %
NEUTROPHILS # BLD AUTO: 4290 CELLS/UL (ref 1500–7800)
NEUTROPHILS NFR BLD AUTO: 55 %
PLATELET # BLD AUTO: 271 THOUSAND/UL (ref 140–400)
PMV BLD REES-ECKER: 9.8 FL (ref 7.5–12.5)
POTASSIUM SERPL-SCNC: 4.4 MMOL/L (ref 3.5–5.3)
PROT SERPL-MCNC: 6.9 G/DL (ref 6.1–8.1)
RBC # BLD AUTO: 4.95 MILLION/UL (ref 4.2–5.8)
SODIUM SERPL-SCNC: 141 MMOL/L (ref 135–146)
TSH SERPL-ACNC: 3.5 MIU/L (ref 0.4–4.5)
WBC # BLD AUTO: 7.8 THOUSAND/UL (ref 3.8–10.8)